# Patient Record
Sex: MALE | Race: BLACK OR AFRICAN AMERICAN | NOT HISPANIC OR LATINO | URBAN - METROPOLITAN AREA
[De-identification: names, ages, dates, MRNs, and addresses within clinical notes are randomized per-mention and may not be internally consistent; named-entity substitution may affect disease eponyms.]

---

## 2021-09-19 ENCOUNTER — EMERGENCY (EMERGENCY)
Facility: HOSPITAL | Age: 36
LOS: 1 days | Discharge: HOME | End: 2021-09-19
Attending: EMERGENCY MEDICINE | Admitting: EMERGENCY MEDICINE
Payer: COMMERCIAL

## 2021-09-19 VITALS
WEIGHT: 240.08 LBS | SYSTOLIC BLOOD PRESSURE: 137 MMHG | OXYGEN SATURATION: 97 % | HEART RATE: 60 BPM | HEIGHT: 73 IN | RESPIRATION RATE: 18 BRPM | TEMPERATURE: 98 F | DIASTOLIC BLOOD PRESSURE: 80 MMHG

## 2021-09-19 DIAGNOSIS — I10 ESSENTIAL (PRIMARY) HYPERTENSION: ICD-10-CM

## 2021-09-19 DIAGNOSIS — R20.0 ANESTHESIA OF SKIN: ICD-10-CM

## 2021-09-19 DIAGNOSIS — M54.12 RADICULOPATHY, CERVICAL REGION: ICD-10-CM

## 2021-09-19 DIAGNOSIS — M54.2 CERVICALGIA: ICD-10-CM

## 2021-09-19 DIAGNOSIS — R53.1 WEAKNESS: ICD-10-CM

## 2021-09-19 DIAGNOSIS — M79.621 PAIN IN RIGHT UPPER ARM: ICD-10-CM

## 2021-09-19 PROCEDURE — 96372 THER/PROPH/DIAG INJ SC/IM: CPT

## 2021-09-19 PROCEDURE — 99283 EMERGENCY DEPT VISIT LOW MDM: CPT | Mod: 25

## 2021-09-19 PROCEDURE — 99284 EMERGENCY DEPT VISIT MOD MDM: CPT

## 2021-09-19 RX ORDER — KETOROLAC TROMETHAMINE 30 MG/ML
30 SYRINGE (ML) INJECTION ONCE
Refills: 0 | Status: DISCONTINUED | OUTPATIENT
Start: 2021-09-19 | End: 2021-09-19

## 2021-09-19 RX ORDER — ACETAMINOPHEN 500 MG
975 TABLET ORAL ONCE
Refills: 0 | Status: COMPLETED | OUTPATIENT
Start: 2021-09-19 | End: 2021-09-19

## 2021-09-19 RX ORDER — CYCLOBENZAPRINE HYDROCHLORIDE 10 MG/1
10 TABLET, FILM COATED ORAL ONCE
Refills: 0 | Status: COMPLETED | OUTPATIENT
Start: 2021-09-19 | End: 2021-09-19

## 2021-09-19 RX ORDER — LIDOCAINE 4 G/100G
1 CREAM TOPICAL ONCE
Refills: 0 | Status: COMPLETED | OUTPATIENT
Start: 2021-09-19 | End: 2021-09-19

## 2021-09-19 RX ORDER — DICLOFENAC SODIUM 75 MG/1
1 TABLET, DELAYED RELEASE ORAL
Qty: 30 | Refills: 0
Start: 2021-09-19

## 2021-09-19 RX ORDER — CYCLOBENZAPRINE HYDROCHLORIDE 10 MG/1
1 TABLET, FILM COATED ORAL
Qty: 15 | Refills: 0
Start: 2021-09-19 | End: 2021-09-23

## 2021-09-19 RX ADMIN — LIDOCAINE 1 PATCH: 4 CREAM TOPICAL at 15:25

## 2021-09-19 RX ADMIN — CYCLOBENZAPRINE HYDROCHLORIDE 10 MILLIGRAM(S): 10 TABLET, FILM COATED ORAL at 15:25

## 2021-09-19 RX ADMIN — Medication 30 MILLIGRAM(S): at 15:48

## 2021-09-19 RX ADMIN — Medication 975 MILLIGRAM(S): at 15:24

## 2021-09-19 RX ADMIN — Medication 975 MILLIGRAM(S): at 15:48

## 2021-09-19 RX ADMIN — Medication 30 MILLIGRAM(S): at 15:25

## 2021-09-19 NOTE — ED PROVIDER NOTE - NSFOLLOWUPINSTRUCTIONS_ED_ALL_ED_FT
Cervical radiculopathy    Take diclofenac 1 tab every 8 hours with food as needed for pain. Take cyclobenzaprine 1 tab every 8 hours as needed for pain/spasm. Try ice or heat, massage, consider acupuncture for additional relief.  Try a back pain patch like icy-hot or similar.  Add tylenol for additional relief - use as directed. Return for increased pain, numbness/weakness in leg, incontinence, any other concerns.     Follow up with a pmd and a spine specialist.      Cervical Radiculopathy    WHAT YOU NEED TO KNOW:    What is cervical radiculopathy? Cervical radiculopathy is a painful condition that happens when a spinal nerve in your neck is pinched or irritated.     Vertebral Column         What causes cervical radiculopathy? Changes in the vertebrae (bones) and discs in your neck can put pressure on a spinal nerve. Discs are natural, spongy cushions between your vertebrae that allow your spine to move. The following can cause a pinched nerve:  •Disc damage may occur if a disc flattens, bulges, or moves over time. An injury can also cause disc damage.      •Cervical spondylosis is when the vertebrae in your neck break down. This normally occurs as you age.       •Growths, such as tumors or cysts (fluid-filled lumps), may grow and press on the nerve.      What are the signs and symptoms of cervical radiculopathy? The most common symptom is sharp pain that travels from your neck all the way down your arm. You may have pain in your shoulder, chest, and hand. The pain may get worse with movement or when you cough or sneeze. You may also have any of the following:   •Burning or tingling sensations in your neck or arm       •Numbness or weakness in your arm or hand that makes it hard for you to  objects      •Headaches       How is cervical radiculopathy diagnosed? Your healthcare provider will ask when and how your symptoms began. He will gently press on your neck to check for tenderness and areas that are not shaped correctly. He will also check your arms and hands for numbness or weakness. You may have any of the following:   •Provocative tests are done to check your response to certain movements. He will ask you to move your neck, shoulder, and arm in different ways. Some movements will increase your symptoms, while others will make you feel better.      •An x-ray is a picture of the bones and tissues in your neck.      •An MRI or a CT scan may be used to take pictures of your neck. The pictures can show problems and changes in your nerves, discs, and vertebrae. You may be given dye to help the pictures show up better. Tell the healthcare provider if you have ever had an allergic reaction to contrast dye. Do not enter the MRI room with anything metal. Metal can cause serious injury. Tell the healthcare provider if you have any metal in or on your body.      •An electromyography is also called an EMG. An EMG is done to test the function of your muscles and the nerves that control them. Electrodes (wires) are placed on the muscle being tested. Needles may be attached to the electrodes and placed in your skin. The electrical activity of your muscles and nerves is measured by a machine attached to the electrodes. Your muscles are tested at rest and during movement.      How is cervical radiculopathy treated?   •NSAIDs decrease swelling and pain. This medicine can be bought without a doctor's order. This medicine can cause stomach bleeding or kidney problems in certain people. If you take blood thinner medicine, always ask your healthcare provider if NSAIDs are safe for you. Always read the medicine label and follow the directions on it before using this medicine.      •Prescription pain medicine may be given to decrease pain. Do not wait until the pain is severe before you take this medicine.      •Steroids help decrease pain and swelling. These may be given as a pill or as an injection in your neck. You may need more than 1 injection if your symptoms do not improve after the first treatment.       •Physical therapy helps stretch and strengthen your muscles. Your physical therapist can teach you how to improve your posture and the way you hold your neck. He may also teach you how to be safely active and avoid further injury. He can also help you develop an exercise program that is safe for your back and neck.       •Surgery may be used to treat a pinched nerve if other treatments have not helped after 6 to 12 weeks.       How can I manage my symptoms?   •Ice helps decrease swelling and pain. Ice may also help prevent tissue damage. Use an ice pack, or put crushed ice in a plastic bag. Cover it with a towel and place it on your neck for 15 to 20 minutes every hour or as directed.      •Rest when you feel it is needed. Slowly start to do more each day. Return to your daily activities as directed.       •Wear a soft collar. You may be given a soft collar to support your neck while you sleep. Wear the soft collar only as directed.   Cervical Collars           •Do light stretches and regular exercise. Your healthcare provider may suggest light stretches to help decrease stiffness in your neck and arm as you recover. After your pain is controlled, you may benefit from regular exercise. Ask what type of exercise is safe for your back and neck.       •Review your work area. A comfortable work area can help prevent neck strain. Ask your employer for an ergonomic review to check the position of your desk, chair, phone, and computer. Make any necessary adjustments for your comfort.      When should I contact my healthcare provider?   •You are losing weight without trying.      •Your pain is worse, even with medicine.      •One or both hands feel more numb than before, or you cannot move your fingers well.      •You have questions or concerns about your condition or care.

## 2021-09-19 NOTE — ED PROVIDER NOTE - PRINCIPAL DIAGNOSIS
We addressed the following today:    1. Bilateral leg pain    Activity modification as discussed    Topical Treatments: Ice    Over the counter medication: Acetaminophen (Tylenol) 1000 mg every 6 hours with food (Maximum of 3000 mg/day)    Ibuprofen (Advil) maximum of 800 mg four times a day with food     Other specific instructions: MRI of the bilateral tibia/fibula without contrast at [facility]: call [phone] to schedule    Continue with shoe inserts as discussed    Follow-up ~1-2 business days after completion of further diagnostic imaging for discussion of results/further medical care (call direct clinic number [[phone]] at any time with questions or concerns)  
Cervical radiculopathy

## 2021-09-19 NOTE — ED ADULT NURSE NOTE - OBJECTIVE STATEMENT
Patient presents alert and oriented to person, place, time, and situation c/o 7/10 right arm pain from the elbow radiating to the clavicle for the past 1 month. Patient denies fall, injury, or heavy lifting. No swelling or pain on palpation noted to the arm. Patient has strong pulses to the arm and has full ROM. Patient sitting in chairs in no acute distress at this time with no nonverbal signs of discomfort. PMH Rt arm infection 3 yrs ago. NKA. Patient is vaccinated against COVID.

## 2021-09-19 NOTE — ED PROVIDER NOTE - PATIENT PORTAL LINK FT
You can access the FollowMyHealth Patient Portal offered by [facility] by registering at the following website: [email or web address]. By joining [employer]’s FollowMyHealth portal, you will also be able to view your health information using other applications (apps) compatible with our system.

## 2021-09-19 NOTE — ED PROVIDER NOTE - OBJECTIVE STATEMENT
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  No h/o similar.  No h/o neck issues.  No trauma/strain/lift/bend prior to onset of sx.  Sx started ~ 1 mo ago and pt thought he slept wrong.  Pt had xray of shoulder and arm that were nl but no other eval.  Pt states pain feels tight.  Pt reports h/o shoulder infection that started w a bite that felt somewhat similar treated w abx many yrs ago but that had associated fever.  No fever, night sweats, wt loss.  No cp, sob, ha.  No sig relief w ibuprofen 800 mg - last dose yest.

## 2021-09-19 NOTE — ED PROVIDER NOTE - CLINICAL SUMMARY MEDICAL DECISION MAKING FREE TEXT BOX
Pt c/o 1 mo RUE pain.  HPI most suggestive of cerv radiculopathy, no sig concern for septic jt, infectious process based on hpi, vs, exam.  Discussed outpt mri and fu w pmd, spine as outpt w pt and friend.  No deficits to warrant emergent imaging.  Will try pain meds; likely dc to fu as outpt.

## 2021-09-20 PROBLEM — I10 ESSENTIAL (PRIMARY) HYPERTENSION: Chronic | Status: ACTIVE | Noted: 2021-09-19

## 2021-09-22 PROBLEM — Z00.00 ENCOUNTER FOR PREVENTIVE HEALTH EXAMINATION: Status: ACTIVE | Noted: 2021-09-22

## 2021-10-05 ENCOUNTER — OUTPATIENT (OUTPATIENT)
Dept: OUTPATIENT SERVICES | Facility: HOSPITAL | Age: 36
LOS: 1 days | End: 2021-10-05
Payer: COMMERCIAL

## 2021-10-05 ENCOUNTER — APPOINTMENT (OUTPATIENT)
Dept: ORTHOPEDIC SURGERY | Facility: CLINIC | Age: 36
End: 2021-10-05
Payer: COMMERCIAL

## 2021-10-05 ENCOUNTER — RESULT REVIEW (OUTPATIENT)
Age: 36
End: 2021-10-05

## 2021-10-05 VITALS
DIASTOLIC BLOOD PRESSURE: 72 MMHG | SYSTOLIC BLOOD PRESSURE: 125 MMHG | OXYGEN SATURATION: 95 % | HEIGHT: 73 IN | BODY MASS INDEX: 31.14 KG/M2 | WEIGHT: 235 LBS | HEART RATE: 60 BPM

## 2021-10-05 DIAGNOSIS — M79.601 PAIN IN RIGHT ARM: ICD-10-CM

## 2021-10-05 PROCEDURE — 72050 X-RAY EXAM NECK SPINE 4/5VWS: CPT

## 2021-10-05 PROCEDURE — 99204 OFFICE O/P NEW MOD 45 MIN: CPT

## 2021-10-05 PROCEDURE — 72050 X-RAY EXAM NECK SPINE 4/5VWS: CPT | Mod: 26

## 2021-10-13 ENCOUNTER — APPOINTMENT (OUTPATIENT)
Dept: MRI IMAGING | Facility: HOSPITAL | Age: 36
End: 2021-10-13

## 2021-10-13 ENCOUNTER — OUTPATIENT (OUTPATIENT)
Dept: OUTPATIENT SERVICES | Facility: HOSPITAL | Age: 36
LOS: 1 days | End: 2021-10-13
Payer: COMMERCIAL

## 2021-10-13 PROCEDURE — 72141 MRI NECK SPINE W/O DYE: CPT | Mod: 26

## 2021-10-13 PROCEDURE — 72141 MRI NECK SPINE W/O DYE: CPT

## 2021-11-08 ENCOUNTER — NON-APPOINTMENT (OUTPATIENT)
Age: 36
End: 2021-11-08

## 2021-12-23 ENCOUNTER — APPOINTMENT (OUTPATIENT)
Dept: ORTHOPEDIC SURGERY | Facility: CLINIC | Age: 36
End: 2021-12-23

## 2022-01-04 NOTE — PHYSICAL EXAM
[de-identified] : Physical Exam:\par \par General: patient is well developed, well nourished, in no acute \par distress, alert and oriented x 3. \par \par Mood and affect: normal\par \par Respiratory: no respiratory distress noted\par \par Skin: no scars over spine, skin intact, no erythema, increased warmth\par \par Alignment:The spine is well compensated in the coronal and sagittal plane. \par \par Gait: The patient is able to toe walk and heel walk without difficulty. \par \par Palpation: +ttp right lateral epicondyle; no tenderness to palpation cervical spine or paraspinal region\par \par Range of motion: Cervical spine ROM is full\par \par Neurologic Exam:\par Motor: Manual Muscle testing in the upper and lower extremities is 5 out of 5 in all muscle groups. There is no evidence of muscular atrophy in the upper extremities. Sensory: Sensation to light touch is grossly intact in the upper and lower extremities\par \par Reflexes: DTR are present and symmetric throughout, negative johns bilaterally, negative inverted radial reflex bilaterally, no clonus, plantar responses are flexor\par \par Special tests: Spurlings sign absent. Lhermitte's sign is absent. .\par \par Vascular: Examination of the peripheral vascular system demonstrates no evidence of congestion or edema. no lymphedema bilateral lower extremities, pulses are present and symmetric in both lower extremities.\par \par  [de-identified] : XR cervical 10/5/21: vertebral body and disc space heights well preserved, no dynamic instability, no fractures

## 2022-01-04 NOTE — HISTORY OF PRESENT ILLNESS
[de-identified] : Mr. LAI is a very pleasant 36 year old male who complains of right upper extremity pain for the past 2 months, atraumatic. Pain diffuse, extends from shoulder down entire upper extremity. Also with neck pain, but right upper extremity pain is primary. Exacerbated with right upper extremity range of motion.\par \par The patient no history of previous spine surgery.\par \par The patient has no history of unexpected weight loss, no history of active cancer, no history bladder or bowel dysfunction, no night pain, no fevers or chills.\par \par The past medical history, surgical history, family history, allergies, medications, 10+ point review of systems, family history and social history were reviewed and non contributory.\par \par

## 2022-01-04 NOTE — DISCUSSION/SUMMARY
[de-identified] : Discussed my findings with the patient. I am recommending an MRI cervical without contrast, order given. I believe some of his symptoms may be coming from his right shoulder, recommend evaluation, referral given. Follow up after imaging has been completed, sooner if there is an issue. All questions answered.\par \par

## 2022-11-25 ENCOUNTER — EMERGENCY (EMERGENCY)
Facility: HOSPITAL | Age: 37
LOS: 1 days | Discharge: HOME | End: 2022-11-25
Attending: EMERGENCY MEDICINE | Admitting: EMERGENCY MEDICINE
Payer: COMMERCIAL

## 2022-11-25 VITALS
OXYGEN SATURATION: 100 % | SYSTOLIC BLOOD PRESSURE: 156 MMHG | RESPIRATION RATE: 16 BRPM | DIASTOLIC BLOOD PRESSURE: 76 MMHG | HEIGHT: 73 IN | HEART RATE: 58 BPM | TEMPERATURE: 98 F | WEIGHT: 259.93 LBS

## 2022-11-25 VITALS
OXYGEN SATURATION: 99 % | TEMPERATURE: 98 F | HEART RATE: 53 BPM | RESPIRATION RATE: 16 BRPM | SYSTOLIC BLOOD PRESSURE: 128 MMHG | DIASTOLIC BLOOD PRESSURE: 75 MMHG

## 2022-11-25 DIAGNOSIS — R42 DIZZINESS AND GIDDINESS: ICD-10-CM

## 2022-11-25 DIAGNOSIS — Z20.822 CONTACT WITH AND (SUSPECTED) EXPOSURE TO COVID-19: ICD-10-CM

## 2022-11-25 DIAGNOSIS — I10 ESSENTIAL (PRIMARY) HYPERTENSION: ICD-10-CM

## 2022-11-25 LAB
ANION GAP SERPL CALC-SCNC: 8 MMOL/L — SIGNIFICANT CHANGE UP (ref 5–17)
BUN SERPL-MCNC: 12 MG/DL — SIGNIFICANT CHANGE UP (ref 7–23)
CALCIUM SERPL-MCNC: 8.9 MG/DL — SIGNIFICANT CHANGE UP (ref 8.4–10.5)
CHLORIDE SERPL-SCNC: 103 MMOL/L — SIGNIFICANT CHANGE UP (ref 96–108)
CO2 SERPL-SCNC: 27 MMOL/L — SIGNIFICANT CHANGE UP (ref 22–31)
CREAT SERPL-MCNC: 1.13 MG/DL — SIGNIFICANT CHANGE UP (ref 0.5–1.3)
EGFR: 86 ML/MIN/1.73M2 — SIGNIFICANT CHANGE UP
GLUCOSE SERPL-MCNC: 91 MG/DL — SIGNIFICANT CHANGE UP (ref 70–99)
HCT VFR BLD CALC: 40.5 % — SIGNIFICANT CHANGE UP (ref 39–50)
HGB BLD-MCNC: 13 G/DL — SIGNIFICANT CHANGE UP (ref 13–17)
MCHC RBC-ENTMCNC: 22.4 PG — LOW (ref 27–34)
MCHC RBC-ENTMCNC: 32.1 GM/DL — SIGNIFICANT CHANGE UP (ref 32–36)
MCV RBC AUTO: 69.7 FL — LOW (ref 80–100)
PLATELET # BLD AUTO: 305 K/UL — SIGNIFICANT CHANGE UP (ref 150–400)
POTASSIUM SERPL-MCNC: 3.9 MMOL/L — SIGNIFICANT CHANGE UP (ref 3.5–5.3)
POTASSIUM SERPL-SCNC: 3.9 MMOL/L — SIGNIFICANT CHANGE UP (ref 3.5–5.3)
RBC # BLD: 5.81 M/UL — HIGH (ref 4.2–5.8)
RBC # FLD: 16.6 % — HIGH (ref 10.3–14.5)
SARS-COV-2 RNA SPEC QL NAA+PROBE: NEGATIVE — SIGNIFICANT CHANGE UP
SODIUM SERPL-SCNC: 138 MMOL/L — SIGNIFICANT CHANGE UP (ref 135–145)
WBC # BLD: 8.7 K/UL — SIGNIFICANT CHANGE UP (ref 3.8–10.5)
WBC # FLD AUTO: 8.7 K/UL — SIGNIFICANT CHANGE UP (ref 3.8–10.5)

## 2022-11-25 PROCEDURE — 70450 CT HEAD/BRAIN W/O DYE: CPT | Mod: 26,MA

## 2022-11-25 PROCEDURE — 99285 EMERGENCY DEPT VISIT HI MDM: CPT

## 2022-11-25 PROCEDURE — 36415 COLL VENOUS BLD VENIPUNCTURE: CPT

## 2022-11-25 PROCEDURE — 80048 BASIC METABOLIC PNL TOTAL CA: CPT

## 2022-11-25 PROCEDURE — 87635 SARS-COV-2 COVID-19 AMP PRB: CPT

## 2022-11-25 PROCEDURE — 96374 THER/PROPH/DIAG INJ IV PUSH: CPT

## 2022-11-25 PROCEDURE — 70450 CT HEAD/BRAIN W/O DYE: CPT | Mod: MA

## 2022-11-25 PROCEDURE — 85025 COMPLETE CBC W/AUTO DIFF WBC: CPT

## 2022-11-25 PROCEDURE — 99284 EMERGENCY DEPT VISIT MOD MDM: CPT | Mod: 25

## 2022-11-25 RX ORDER — DIPHENHYDRAMINE HCL 50 MG
25 CAPSULE ORAL ONCE
Refills: 0 | Status: DISCONTINUED | OUTPATIENT
Start: 2022-11-25 | End: 2022-11-29

## 2022-11-25 RX ORDER — DIAZEPAM 5 MG
5 TABLET ORAL ONCE
Refills: 0 | Status: DISCONTINUED | OUTPATIENT
Start: 2022-11-25 | End: 2022-11-25

## 2022-11-25 RX ORDER — METOCLOPRAMIDE HCL 10 MG
10 TABLET ORAL ONCE
Refills: 0 | Status: COMPLETED | OUTPATIENT
Start: 2022-11-25 | End: 2022-11-25

## 2022-11-25 RX ADMIN — Medication 10 MILLIGRAM(S): at 23:21

## 2022-11-25 RX ADMIN — Medication 5 MILLIGRAM(S): at 23:08

## 2022-11-25 NOTE — ED ADULT NURSE NOTE - OBJECTIVE STATEMENT
Pt presented to ED with c/o of dizziness since waking yesterday. AOX4. Patient LOC changes, blurry vision, denies chest pain, discomfort, shortness of breath, difficulty breathing and any form of distress not noted. Patient oriented to ED area. All needs attended. Rounding in progress. Fall risk precautions maintained. Pt presented to ED with c/o of dizziness since waking yesterday and pressure like pain behind the rt eye which he rates as 8/10. AOX4. Patient denies LOC changes, denies chest pain, discomfort, shortness of breath, difficulty breathing and any form of distress not noted. Patient oriented to ED area. All needs attended. Rounding in progress. Fall risk precautions maintained.

## 2022-11-25 NOTE — ED PROVIDER NOTE - NSFOLLOWUPINSTRUCTIONS_ED_ALL_ED_FT
Please see your primary care provider for followup.  Call for appointment.  If you have any problems with followup, please call the ED Referral Coordinator at [phone].  Return to the ER if symptoms worsen or other concerns.    Dizziness    Dizziness can manifest as a feeling of unsteadiness or light-headedness. You may feel like you are about to faint. This condition can be caused by a number of things, including medicines, dehydration, or illness. Drink enough fluid to keep your urine clear or pale yellow. Do not drink alcohol and limit your caffeine intake. Avoid quick or sudden movements.  Rise slowly from chairs and steady yourself until you feel okay. In the morning, first sit up on the side of the bed.    SEEK IMMEDIATE MEDICAL CARE IF YOU HAVE ANY OF THE FOLLOWING SYMPTOMS: vomiting, changes in your vision or speech, weakness in your arms or legs, trouble speaking or swallowing, chest pain, abdominal pain, shortness of breath, sweating, bleeding, headache, neck pain, or fever.

## 2022-11-25 NOTE — ED ADULT TRIAGE NOTE - ADDITIONAL COMPLAINTS
Patient presents with complaints of a rash to his groin/luly region x 5-6 days after taking Bactrim.  No distress noted on arrival.
Additional Complaints

## 2022-11-25 NOTE — ED ADULT TRIAGE NOTE - OTHER COMPLAINTS
pt admits to taking meclizine with only relief of n/v. c.o persistent room spinning sensation and headaches.

## 2022-11-25 NOTE — ED PROVIDER NOTE - PATIENT PORTAL LINK FT
You can access the FollowMyHealth Patient Portal offered by [facility] by registering at the following website: [email or web address]. By joining [employer]’s FollowMyHealth portal, you will also be able to view your health information using other applications (apps) compatible with our system.

## 2022-11-25 NOTE — ED PROVIDER NOTE - ENMT, MLM
Airway patent, Nasal mucosa clear. Mouth with normal mucosa. Throat has no vesicles, no oropharyngeal exudates and uvula is midline. Tm normal. neck supple

## 2022-11-25 NOTE — ED ADULT NURSE NOTE - CHPI ED NUR SYMPTOMS NEG
no blurred vision/no change in level of consciousness/no confusion/no fever/no loss of consciousness/no numbness/no vomiting no change in level of consciousness/no confusion/no fever/no loss of consciousness/no numbness/no vomiting

## 2022-11-25 NOTE — ED PROVIDER NOTE - PROGRESS NOTE DETAILS
headache better but still feels dizzy pt feels much better, dizziness subsided, head ct- no acute findings. d/c home [place], pt has f/u in 3 days scheduled

## 2022-11-25 NOTE — ED PROVIDER NOTE - OBJECTIVE STATEMENT
history of htn, on lisinopril/ amlodipine. Here with dizziness since waking yesterday. Reports feels off balance/ nausea/ dizziness like things are moving. Went to another ER. Told his HR was low, had labs / ekg done, prescribed meclizine/ zofran. Has been taking without relief. Notes mild headache. Denies fever, chills, trauma, numbness, weakness. history of htn, on lisinopril/ amlodipine. Here with dizziness since waking yesterday. Reports feels off balance/ nausea/ dizziness like things are moving. Went to another ER. Told his HR was low, had labs / ekg done, prescribed meclizine/ zofran. Has been taking without relief. Notes mild headache/pain behind eye. No vision changes. Denies fever, chills, trauma, numbness, weakness.

## 2022-11-25 NOTE — ED PROVIDER NOTE - NEUROLOGICAL, MLM
Alert and oriented, no focal deficits, no motor or sensory deficits. CN 2-12 intact, finger to nose normal, speech normal, gait normal

## 2022-11-25 NOTE — ED PROVIDER NOTE - MDM PATIENT STATEMENT FOR ADDL TREATMENT
Alert and oriented, no motor or sensory deficits.
Patient with one or more new problems requiring additional work-up/treatment.

## 2022-11-25 NOTE — ED PROVIDER NOTE - CLINICAL SUMMARY MEDICAL DECISION MAKING FREE TEXT BOX
reports 1 day of dizziness described as vertigo/off balance feeling. neuro intact/ no focal deficit or rotary nystagmus to suggest central cause but given history of htn, persistent symptoms after taking meclizine, ct head ordered to r/o mass/ bleed/ cva . gait steady but reports feels more dizzy with walking. labs done to eval electrolyte abnormality and normal.  given reglan for headache, valium for dizziness. headache improved but still dizzy. given benadryl. signed out to Dr. Smith/ ALEX Chahal pending reassessment/ results of ct.

## 2022-11-25 NOTE — ED PROVIDER NOTE - CARE PROVIDER_API CALL
Katya Corona)  Neurology  [address] [address]  Phone: [phone]  Fax: [phone]  Follow Up Time:

## 2022-11-26 LAB
BASOPHILS # BLD AUTO: 0 K/UL — SIGNIFICANT CHANGE UP (ref 0–0.2)
BASOPHILS NFR BLD AUTO: 0 % — SIGNIFICANT CHANGE UP (ref 0–2)
EOSINOPHIL # BLD AUTO: 0.16 K/UL — SIGNIFICANT CHANGE UP (ref 0–0.5)
EOSINOPHIL NFR BLD AUTO: 1.8 % — SIGNIFICANT CHANGE UP (ref 0–6)
LYMPHOCYTES # BLD AUTO: 4.08 K/UL — HIGH (ref 1–3.3)
LYMPHOCYTES # BLD AUTO: 46.9 % — HIGH (ref 13–44)
MONOCYTES # BLD AUTO: 0.62 K/UL — SIGNIFICANT CHANGE UP (ref 0–0.9)
MONOCYTES NFR BLD AUTO: 7.1 % — SIGNIFICANT CHANGE UP (ref 2–14)
NEUTROPHILS # BLD AUTO: 3.77 K/UL — SIGNIFICANT CHANGE UP (ref 1.8–7.4)
NEUTROPHILS NFR BLD AUTO: 43.3 % — SIGNIFICANT CHANGE UP (ref 43–77)

## 2022-11-26 RX ORDER — DIAZEPAM 5 MG
1 TABLET ORAL
Qty: 12 | Refills: 0
Start: 2022-11-26 | End: 2022-11-29

## 2022-12-05 ENCOUNTER — APPOINTMENT (OUTPATIENT)
Dept: HEART AND VASCULAR | Facility: CLINIC | Age: 37
End: 2022-12-05

## 2022-12-06 ENCOUNTER — APPOINTMENT (OUTPATIENT)
Dept: OTOLARYNGOLOGY | Facility: CLINIC | Age: 37
End: 2022-12-06

## 2022-12-06 ENCOUNTER — NON-APPOINTMENT (OUTPATIENT)
Age: 37
End: 2022-12-06

## 2022-12-06 VITALS
TEMPERATURE: 98.1 F | SYSTOLIC BLOOD PRESSURE: 172 MMHG | HEART RATE: 99 BPM | BODY MASS INDEX: 34.46 KG/M2 | DIASTOLIC BLOOD PRESSURE: 97 MMHG | WEIGHT: 260 LBS | HEIGHT: 73 IN

## 2022-12-06 DIAGNOSIS — R42 DIZZINESS AND GIDDINESS: ICD-10-CM

## 2022-12-06 DIAGNOSIS — H81.11 BENIGN PAROXYSMAL VERTIGO, RIGHT EAR: ICD-10-CM

## 2022-12-06 PROCEDURE — 95992 CANALITH REPOSITIONING PROC: CPT

## 2022-12-06 PROCEDURE — 99204 OFFICE O/P NEW MOD 45 MIN: CPT | Mod: 25

## 2022-12-07 NOTE — PROCEDURE
[FreeTextEntry3] : -\par Right Epley Maneuver\par Procedure/Therapy Note \par \par Pre-operative Diagnosis: Right Sided Benign Paroxysmal Positional Vertigo\par Post-operative Diagnosis: Right Sided Benign Paroxysmal Positional Vertigo\par Procedure: Right Sided Canalith Repositioning - Epley Maneuver CPT code 54354\par \par Procedure Details: \par The patient was placed in the sitting position on the exam room table. I then placed a pillow behind the patient. I then turned the patient's head to the right and guided the patient to the supine position with quickly with their head hanging off the edge of table. At this point the patient had both rotational nystagmus as well as symptoms of vertigo. After allowing 1 min for the symptoms to subside, I quickly turned the patient's head to the left. Again we waited approximately 1 min for symptoms to subside. Next, I helped roll the patient onto their left side with their head tucked into their left shoulder. After waiting another minute the patient was guided into an upright sitting position and then their head was allowed to raise and straighten.\par \par Condition:\par Stable. Patient tolerated procedure well.\par \par Complications:\par None\par \par

## 2022-12-07 NOTE — HISTORY OF PRESENT ILLNESS
[de-identified] : 12/6/22\par 37M presents with dizziness 12 days ago when he was getting up from sitting. Associated symptoms include nausea and vomiting. He went to the ER at that time, chest xray and blood-work which was normal per pt. He was diagnosed with vertigo and started on meclizine. His symptoms lasted for 4 days. He also went to [facility] ER 1 week ago and had CT scans done which were normal and told to follow up with ENT. Denies hearing loss, tinnitus, otorrhea, and otalgia. His dizziness symptoms have persisted, with head movements.  He did also see a neurologist and had an EEG and imaging done.

## 2022-12-07 NOTE — ASSESSMENT
[FreeTextEntry1] : 37 year old male presents with dizziness for 2 weeks. On exam, the Freya Hallpike was positive to the right consistent with right sided benign paroxysmal positional vertigo. In the office, we did perform right sided Epley maneuver. We have provided our [facility] handout and reviewed it with the patient. If his symptoms persist or worsen, he will follow up with us at which time we will obtain a VNG. \par \par - post Epley maneuver instructions\par - fu as needed if symptoms persist or worsen despite treatment.

## 2022-12-07 NOTE — PHYSICAL EXAM
[Midline] : trachea located in midline position [Normal] : no rashes [] : Tandem Romberg test is negative [de-identified] : + right sided nystagmus

## 2023-09-15 NOTE — ED ADULT NURSE REASSESSMENT NOTE - NS ED NURSE REASSESS RESPONSE TO CARE
Have been attempting to reach patient, unable to leave message and no answer.  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